# Patient Record
Sex: FEMALE | Race: WHITE
[De-identification: names, ages, dates, MRNs, and addresses within clinical notes are randomized per-mention and may not be internally consistent; named-entity substitution may affect disease eponyms.]

---

## 2017-10-11 ENCOUNTER — HOSPITAL ENCOUNTER (EMERGENCY)
Dept: HOSPITAL 92 - ERS | Age: 42
Discharge: HOME | End: 2017-10-11
Payer: COMMERCIAL

## 2017-10-11 DIAGNOSIS — M54.12: Primary | ICD-10-CM

## 2017-10-11 PROCEDURE — 72125 CT NECK SPINE W/O DYE: CPT

## 2017-10-11 PROCEDURE — 96375 TX/PRO/DX INJ NEW DRUG ADDON: CPT

## 2017-10-11 PROCEDURE — 96374 THER/PROPH/DIAG INJ IV PUSH: CPT

## 2017-10-11 NOTE — CT
PRELIMINARY REPORT/[facility]/EMERGENCY AFTER

HOURS PROCEDURE:

 

EXAM:

CT Cervical Spine Without Intravenous Contrast

 

CLINICAL HISTORY:

42 years old, female; Pain; Neck pain; Patient HX: F42 presents to ed C/O l arm pain, onset this aft
ernoon (1530). Pt initially thought pain was due to change in weather or pinched nerve. Location is 
l shoulder. Quality is soreness, dull. Pain has incr, such that pt could not sleep. Radiated slightl
y past elbow. Exacerbated by walking. No similar pmh. Denies symptoms in hand. Denies numbness, feve
rs, recent procedures.

 

TECHNIQUE:

Axial computed tomography images of the cervical spine without intravenous contrast.

Coronal and sagittal reformatted images were created and reviewed.

 

COMPARISON:

No relevant prior studies available.

 

FINDINGS:

On axial CT images, no definite acute fracture is visible.

Sagittal and coronal reconstructions show no fracture or subluxation.

No definite/significant disc herniation by CT, MRI could be more sensitive if clinically indicated.

 

IMPRESSION:

No definite acute fracture or subluxation by CT.

Other findings discussed above.

 

Thank you for allowing us to participate in the care of your patient.

 

Dictated and Authenticated by: Shakir Short MD

10/11/2017 3:14 AM Central Time ([place] \T\ [place])

 

                          FINAL REPORT

 

EMERGENT AFTER HOURS CT OF CERVICAL SPINE WITHOUT CONTRAST:

 

FINDINGS/IMPRESSION:   

I agree with the findings and impression given in the preliminary report per [facility] physician.  No milka
dence of acute osseous abnormality of the cervical spine. 

 

POS: [facility]

## 2017-10-11 NOTE — RAD
THREE VIEWS LEFT SHOULDER:

 

COMPARISON: 

None.

 

HISTORY: 

Left arm pain and shoulder pain.

 

FINDINGS: 

Three views left shoulder show no evidence of acute fracture or dislocation.  No degenerative change
s are seen.  The visualized left thorax is unremarkable.

 

IMPRESSION: 

Unremarkable exam.

 

POS: [facility]

## 2021-05-18 ENCOUNTER — HOSPITAL ENCOUNTER (OUTPATIENT)
Dept: HOSPITAL 92 - CSHULT | Age: 46
Discharge: HOME | End: 2021-05-18
Attending: INTERNAL MEDICINE
Payer: COMMERCIAL

## 2021-05-18 DIAGNOSIS — N28.1: ICD-10-CM

## 2021-05-18 DIAGNOSIS — R14.0: ICD-10-CM

## 2021-05-18 DIAGNOSIS — M06.80: ICD-10-CM

## 2021-05-18 DIAGNOSIS — K76.89: ICD-10-CM

## 2021-05-18 DIAGNOSIS — F41.1: ICD-10-CM

## 2021-05-18 DIAGNOSIS — K59.09: Primary | ICD-10-CM

## 2021-05-18 DIAGNOSIS — R13.10: ICD-10-CM

## 2021-05-18 PROCEDURE — 93975 VASCULAR STUDY: CPT

## 2022-08-10 ENCOUNTER — HOSPITAL ENCOUNTER (OUTPATIENT)
Dept: HOSPITAL 92 - RAD | Age: 47
Discharge: HOME | End: 2022-08-10
Attending: INTERNAL MEDICINE
Payer: COMMERCIAL

## 2022-08-10 DIAGNOSIS — R06.00: Primary | ICD-10-CM

## 2022-08-10 PROCEDURE — 71046 X-RAY EXAM CHEST 2 VIEWS: CPT

## 2023-08-10 ENCOUNTER — HOSPITAL ENCOUNTER (OUTPATIENT)
Dept: HOSPITAL 92 - RAD | Age: 48
Discharge: HOME | End: 2023-08-10
Attending: INTERNAL MEDICINE
Payer: COMMERCIAL

## 2023-08-10 DIAGNOSIS — R06.00: Primary | ICD-10-CM

## 2023-08-10 PROCEDURE — 71046 X-RAY EXAM CHEST 2 VIEWS: CPT
